# Patient Record
Sex: MALE | Race: WHITE | NOT HISPANIC OR LATINO | URBAN - METROPOLITAN AREA
[De-identification: names, ages, dates, MRNs, and addresses within clinical notes are randomized per-mention and may not be internally consistent; named-entity substitution may affect disease eponyms.]

---

## 2021-10-29 ENCOUNTER — WEB ENCOUNTER (OUTPATIENT)
Dept: URBAN - METROPOLITAN AREA CLINIC 19 | Facility: CLINIC | Age: 56
End: 2021-10-29

## 2021-10-29 ENCOUNTER — OFFICE VISIT (OUTPATIENT)
Dept: URBAN - METROPOLITAN AREA CLINIC 19 | Facility: CLINIC | Age: 56
End: 2021-10-29
Payer: COMMERCIAL

## 2021-10-29 ENCOUNTER — LAB OUTSIDE AN ENCOUNTER (OUTPATIENT)
Dept: URBAN - METROPOLITAN AREA CLINIC 19 | Facility: CLINIC | Age: 56
End: 2021-10-29

## 2021-10-29 ENCOUNTER — TELEPHONE ENCOUNTER (OUTPATIENT)
Dept: URBAN - METROPOLITAN AREA SURGERY CENTER 30 | Facility: SURGERY CENTER | Age: 56
End: 2021-10-29

## 2021-10-29 VITALS
BODY MASS INDEX: 35.92 KG/M2 | DIASTOLIC BLOOD PRESSURE: 98 MMHG | HEART RATE: 92 BPM | TEMPERATURE: 98.2 F | HEIGHT: 68 IN | SYSTOLIC BLOOD PRESSURE: 148 MMHG | WEIGHT: 237 LBS

## 2021-10-29 DIAGNOSIS — F10.20 UNCOMPLICATED ALCOHOL DEPENDENCE: ICD-10-CM

## 2021-10-29 DIAGNOSIS — Z86.010 PERSONAL HISTORY OF COLONIC POLYPS: ICD-10-CM

## 2021-10-29 DIAGNOSIS — R19.7 ACUTE DIARRHEA: ICD-10-CM

## 2021-10-29 PROCEDURE — 99214 OFFICE O/P EST MOD 30 MIN: CPT | Performed by: INTERNAL MEDICINE

## 2021-10-29 RX ORDER — SODIUM, POTASSIUM,MAG SULFATES 17.5-3.13G
354ML SOLUTION, RECONSTITUTED, ORAL ORAL
Qty: 354 MILLILITER | Refills: 0 | OUTPATIENT
Start: 2021-10-29 | End: 2021-10-30

## 2021-10-29 RX ORDER — METOPROLOL TARTRATE 50 MG/1
TAKE 1 TABLET (50 MG) BY ORAL ROUTE 2 TIMES PER DAY WITH MEALS TABLET, FILM COATED ORAL 2
Qty: 0 | Refills: 0 | Status: ACTIVE | COMMUNITY
Start: 1900-01-01

## 2021-10-29 NOTE — HPI-TODAY'S VISIT:
Mr. Bernal is a 56-year-old male who presents today for evaluation of chronic diarrhea.  He reports his symptoms have been ongoing since February 2021, but have gotten worse over the last month.He reports he had cellulitis in January/February and was on antibiotics.  He was tested for C. difficile in February and it was negative.  He was given Flagyl in February for his diarrhea, but he did not take it because he drinks alcohol daily.  No recent travel outside the [place].  No medication, diet, or weight changes.  He has tried Pepto-Bismol, which is no longer helping.  Cholecystectomy in 2010.  No abdominal pain or nausea.  He reports he has urgency and has had accidents.  He reports he has a long drive to work.  Reports he had 17 bowel movements yesterday.  He reports his stool is always loose and watery with mucus.  No blood in stool.  Unsure when his last blood work was.  He reports he is afraid to stop drinking.  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  He reports he was previously drinking 10 drinks a day, but he is down to 3 or 4 now. He denies liver disease. He reports he has been on Prilosec 20 mg since 2010 for GERD.  He denies cardiac/kidney disease, diabetes, blood thinners, and home O2.  Last colonoscopy with Dr. Barron in 2019- advised to repeat in 3 years due to polyps.

## 2021-10-31 LAB
A/G RATIO: 1.5
ALBUMIN: 3.6
ALKALINE PHOSPHATASE: 109
ALT (SGPT): 102
AST (SGOT): 255
BILIRUBIN, TOTAL: 1.4
BUN/CREATININE RATIO: 15
BUN: 12
C-REACTIVE PROTEIN, QUANT: <1
CALCIUM: 8.1
CARBON DIOXIDE, TOTAL: 21
CHLORIDE: 86
CREATININE: 0.82
EGFR IF AFRICN AM: 116
EGFR IF NONAFRICN AM: 100
ENDOMYSIAL ANTIBODY IGA: NEGATIVE
GLOBULIN, TOTAL: 2.4
GLUCOSE: 143
HEMATOCRIT: 46.9
HEMATOLOGY COMMENTS:: (no result)
HEMOGLOBIN: 17.2
IMMUNOGLOBULIN A, QN, SERUM: 213
MCH: 37.1
MCHC: 36.7
MCV: 101
NRBC: (no result)
PLATELETS: 170
POTASSIUM: 3.8
PROTEIN, TOTAL: 6
RBC: 4.64
RDW: 14.6
SODIUM: 125
T-TRANSGLUTAMINASE (TTG) IGA: <2
WBC: 6.6

## 2021-11-02 PROBLEM — 66590003: Status: ACTIVE | Noted: 2021-11-02

## 2021-11-02 PROBLEM — 62315008: Status: ACTIVE | Noted: 2021-10-29

## 2021-11-02 PROBLEM — 428283002: Status: ACTIVE | Noted: 2021-10-29

## 2021-11-03 ENCOUNTER — TELEPHONE ENCOUNTER (OUTPATIENT)
Dept: URBAN - METROPOLITAN AREA CLINIC 92 | Facility: CLINIC | Age: 56
End: 2021-11-03

## 2021-11-03 ENCOUNTER — OFFICE VISIT (OUTPATIENT)
Dept: URBAN - METROPOLITAN AREA SURGERY CENTER 31 | Facility: SURGERY CENTER | Age: 56
End: 2021-11-03
Payer: COMMERCIAL

## 2021-11-03 DIAGNOSIS — R19.7 ACUTE DIARRHEA: ICD-10-CM

## 2021-11-03 DIAGNOSIS — Z86.010 ADENOMAS PERSONAL HISTORY OF COLONIC POLYPS: ICD-10-CM

## 2021-11-03 DIAGNOSIS — D12.4 ADENOMA OF DESCENDING COLON: ICD-10-CM

## 2021-11-03 PROBLEM — 62315008 DIARRHEA: Status: ACTIVE | Noted: 2021-11-03

## 2021-11-03 PROCEDURE — G8907 PT DOC NO EVENTS ON DISCHARG: HCPCS | Performed by: INTERNAL MEDICINE

## 2021-11-03 PROCEDURE — 45380 COLONOSCOPY AND BIOPSY: CPT | Performed by: INTERNAL MEDICINE

## 2021-11-03 PROCEDURE — 45385 COLONOSCOPY W/LESION REMOVAL: CPT | Performed by: INTERNAL MEDICINE

## 2021-11-03 RX ORDER — DIPHENOXYLATE HYDROCHLORIDE AND ATROPINE SULFATE 2.5; .025 MG/1; MG/1
1-2 TABLETS TABLET ORAL
Qty: 60 | Refills: 0 | OUTPATIENT
Start: 2021-11-03 | End: 2021-11-12

## 2021-11-03 RX ORDER — METOPROLOL TARTRATE 50 MG/1
TAKE 1 TABLET (50 MG) BY ORAL ROUTE 2 TIMES PER DAY WITH MEALS TABLET, FILM COATED ORAL 2
Qty: 0 | Refills: 0 | Status: ACTIVE | COMMUNITY
Start: 1900-01-01

## 2021-11-04 LAB — GASTROINTESTINAL PATHOGEN: (no result)

## 2021-11-12 LAB
CALPROTECTIN, FECAL: 104
FATS, NEUTRAL: NORMAL
FATS, TOTAL: NORMAL
PANCREATIC ELASTASE, FECAL: 475

## 2021-11-30 ENCOUNTER — OFFICE VISIT (OUTPATIENT)
Dept: URBAN - METROPOLITAN AREA CLINIC 19 | Facility: CLINIC | Age: 56
End: 2021-11-30
Payer: COMMERCIAL

## 2021-11-30 DIAGNOSIS — K58.9 IBS (IRRITABLE BOWEL SYNDROME)-DIARRHEA: ICD-10-CM

## 2021-11-30 DIAGNOSIS — F10.10 ALCOHOL ABUSE: ICD-10-CM

## 2021-11-30 DIAGNOSIS — R74.01 ELEVATED TRANSAMINASE LEVEL: ICD-10-CM

## 2021-11-30 DIAGNOSIS — R17 ELEVATED BILIRUBIN: ICD-10-CM

## 2021-11-30 PROBLEM — 15167005 ALCOHOL ABUSE: Status: ACTIVE | Noted: 2021-11-30

## 2021-11-30 PROBLEM — 18165001 JAUNDICE: Status: ACTIVE | Noted: 2021-11-30

## 2021-11-30 PROCEDURE — 99214 OFFICE O/P EST MOD 30 MIN: CPT | Performed by: INTERNAL MEDICINE

## 2021-11-30 RX ORDER — METOPROLOL TARTRATE 50 MG/1
TAKE 1 TABLET (50 MG) BY ORAL ROUTE 2 TIMES PER DAY WITH MEALS TABLET, FILM COATED ORAL 2
Qty: 0 | Refills: 0 | Status: ACTIVE | COMMUNITY
Start: 1900-01-01

## 2021-11-30 RX ORDER — RIFAXIMIN 550 MG/1
1 TABLET TABLET ORAL THREE TIMES A DAY
Qty: 42 TABLET | Refills: 0 | OUTPATIENT
Start: 2021-11-30 | End: 2021-12-14

## 2021-11-30 RX ORDER — DIPHENOXYLATE HYDROCHLORIDE AND ATROPINE SULFATE 2.5; .025 MG/1; MG/1
1 TABLET AS NEEDED TABLET ORAL
Qty: 120 TABLET | Refills: 0 | OUTPATIENT
Start: 2021-11-30 | End: 2021-12-30

## 2021-11-30 NOTE — HPI-TODAY'S VISIT:
10/29/21 (Macrina Irizarry, SHAR):  Mr. Bernal is a 56-year-old male who presents today for evaluation of chronic diarrhea.  He reports his symptoms have been ongoing since February 2021, but have gotten worse over the last month.He reports he had cellulitis in January/February and was on antibiotics.  He was tested for C. difficile in February and it was negative.  He was given Flagyl in February for his diarrhea, but he did not take it because he drinks alcohol daily.  No recent travel outside the [place].  No medication, diet, or weight changes.  He has tried Pepto-Bismol, which is no longer helping.  Cholecystectomy in 2010.  No abdominal pain or nausea.  He reports he has urgency and has had accidents.  He reports he has a long drive to work.  Reports he had 17 bowel movements yesterday.  He reports his stool is always loose and watery with mucus.  No blood in stool.  Unsure when his last blood work was.  He reports he is afraid to stop drinking.  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  He reports he was previously drinking 10 drinks a day, but he is down to 3 or 4 now. He denies liver disease. He reports he has been on Prilosec 20 mg since 2010 for GERD.  He denies cardiac/kidney disease, diabetes, blood thinners, and home O2.  Last colonoscopy with Dr. Barron in 2019- advised to repeat in 3 years due to polyps.  11/30/21:  Patient returns to clinic to see me after his colonoscopy.  Macrina Irizarry had ordered some stool tests at his last visit that basically ruled out any infectious, inflammatory or malabsorptive cause for his diarrhea (negative GI pathogen panel, pancreatic elastase, fecal fat, celiac panel, inflammatory markers).  On 11/3/21, I performed his colonoscopy and found no inflammation, both endoscopically and pathologically.  I did find multiple colon polyps that I removed - he will need surveillance in 3 years.  I gave him Lomotil at the time of his colonoscopy that appear to have helped him avoid severe urgency and fecal incontinence.  However, we discussed the possibility of IBS-D and diarrhea related to excessive alcohol use.  He had labs drawn back on 10/29/21 that showed a sodium of 125, bilirubin 1.4, , and .  Patient is still afraid to stop drinking without medical supervision (detox), and he does not want the stigma attached to alcohol dependence.  He has a counselor who is reportedly helping him - I suggested that he take a "vacation" during which time he can get through the withdrawal period.  There are certainly concerns about developing cirrhosis, although he shows no signs of jaundice, volume overload, bleeding, or confusion.

## 2021-12-01 LAB
A/G RATIO: 1.8
ALBUMIN: 4.1
ALKALINE PHOSPHATASE: 76
ALT (SGPT): 32
AST (SGOT): 26
BILIRUBIN, TOTAL: 0.3
BUN/CREATININE RATIO: 14
BUN: 13
CALCIUM: 9
CARBON DIOXIDE, TOTAL: 22
CHLORIDE: 105
CREATININE: 0.9
EGFR IF AFRICN AM: 112
EGFR IF NONAFRICN AM: 96
GLOBULIN, TOTAL: 2.3
GLUCOSE: 128
HEMATOCRIT: 39.3
HEMOGLOBIN: 13.5
INR: 0.9
MCH: 35.7
MCHC: 34.4
MCV: 104
NRBC: (no result)
PLATELETS: 158
POTASSIUM: 4.4
PROTEIN, TOTAL: 6.4
PROTHROMBIN TIME: 9.8
RBC: 3.78
RDW: 14.4
SODIUM: 144
WBC: 7

## 2021-12-09 ENCOUNTER — WEB ENCOUNTER (OUTPATIENT)
Dept: URBAN - METROPOLITAN AREA CLINIC 19 | Facility: CLINIC | Age: 56
End: 2021-12-09

## 2022-02-22 ENCOUNTER — OFFICE VISIT (OUTPATIENT)
Dept: URBAN - METROPOLITAN AREA CLINIC 19 | Facility: CLINIC | Age: 57
End: 2022-02-22

## 2022-02-28 ENCOUNTER — OFFICE VISIT (OUTPATIENT)
Dept: URBAN - METROPOLITAN AREA CLINIC 19 | Facility: CLINIC | Age: 57
End: 2022-02-28
Payer: COMMERCIAL

## 2022-02-28 ENCOUNTER — WEB ENCOUNTER (OUTPATIENT)
Dept: URBAN - METROPOLITAN AREA CLINIC 19 | Facility: CLINIC | Age: 57
End: 2022-02-28

## 2022-02-28 DIAGNOSIS — K58.9 IBS (IRRITABLE BOWEL SYNDROME)-DIARRHEA: ICD-10-CM

## 2022-02-28 DIAGNOSIS — K70.10 ALCOHOLIC HEPATITIS: ICD-10-CM

## 2022-02-28 PROCEDURE — 99213 OFFICE O/P EST LOW 20 MIN: CPT | Performed by: INTERNAL MEDICINE

## 2022-02-28 RX ORDER — LANSOPRAZOLE 30 MG
1 CAPSULE BEFORE A MEAL CAPSULE,DELAYED RELEASE (ENTERIC COATED) ORAL ONCE A DAY
Status: ACTIVE | COMMUNITY

## 2022-02-28 RX ORDER — TOPIRAMATE 200 MG
1 TABLET TABLET ORAL ONCE A DAY
Status: ACTIVE | COMMUNITY

## 2022-02-28 RX ORDER — METOPROLOL TARTRATE 50 MG/1
TAKE 1 TABLET (50 MG) BY ORAL ROUTE 2 TIMES PER DAY WITH MEALS TABLET, FILM COATED ORAL 2
Qty: 0 | Refills: 0 | Status: ACTIVE | COMMUNITY
Start: 1900-01-01

## 2022-02-28 RX ORDER — TRAZODONE HYDROCHLORIDE 100 MG/1
1 TABLET AT BEDTIME TABLET, FILM COATED ORAL ONCE A DAY
Status: ACTIVE | COMMUNITY

## 2022-02-28 NOTE — HPI-TODAY'S VISIT:
10/29/21 (Macrina Irizarry, SHAR):  Mr. Bernal is a 56-year-old male who presents today for evaluation of chronic diarrhea.  He reports his symptoms have been ongoing since February 2021, but have gotten worse over the last month.He reports he had cellulitis in January/February and was on antibiotics.  He was tested for C. difficile in February and it was negative.  He was given Flagyl in February for his diarrhea, but he did not take it because he drinks alcohol daily.  No recent travel outside the [place].  No medication, diet, or weight changes.  He has tried Pepto-Bismol, which is no longer helping.  Cholecystectomy in 2010.  No abdominal pain or nausea.  He reports he has urgency and has had accidents.  He reports he has a long drive to work.  Reports he had 17 bowel movements yesterday.  He reports his stool is always loose and watery with mucus.  No blood in stool.  Unsure when his last blood work was.  He reports he is afraid to stop drinking.  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  He reports he was previously drinking 10 drinks a day, but he is down to 3 or 4 now. He denies liver disease. He reports he has been on Prilosec 20 mg since 2010 for GERD.  He denies cardiac/kidney disease, diabetes, blood thinners, and home O2.  Last colonoscopy with Dr. Barron in 2019- advised to repeat in 3 years due to polyps.  11/30/21:  Patient returns to clinic to see me after his colonoscopy.  Macrina Irizarry had ordered some stool tests at his last visit that basically ruled out any infectious, inflammatory or malabsorptive cause for his diarrhea (negative GI pathogen panel, pancreatic elastase, fecal fat, celiac panel, inflammatory markers).  On 11/3/21, I performed his colonoscopy and found no inflammation, both endoscopically and pathologically.  I did find multiple colon polyps that I removed - he will need surveillance in 3 years.  I gave him Lomotil at the time of his colonoscopy that appear to have helped him avoid severe urgency and fecal incontinence.  However, we discussed the possibility of IBS-D and diarrhea related to excessive alcohol use.  He had labs drawn back on 10/29/21 that showed a sodium of 125, bilirubin 1.4, , and .  Patient is still afraid to stop drinking without medical supervision (detox), and he does not want the stigma attached to alcohol dependence.  He has a counselor who is reportedly helping him - I suggested that he take a "vacation" during which time he can get through the withdrawal period.  There are certainly concerns about developing cirrhosis, although he shows no signs of jaundice, volume overload, bleeding, or confusion.  2/28/22:  Patient returns to clinic for reevaluation of his GI issues.  He has been sober now for 38 days and feels better, especially after completion of Xifaxan.  He has only had to use Lomotil one time.  Overall, he feels that the diarrhea has resolved.

## 2022-03-01 LAB
A/G RATIO: 2
ALBUMIN: 4.6
ALKALINE PHOSPHATASE: 84
ALT (SGPT): 28
AST (SGOT): 19
BILIRUBIN, TOTAL: 0.4
BUN/CREATININE RATIO: 12
BUN: 11
CALCIUM: 10.4
CARBON DIOXIDE, TOTAL: 21
CHLORIDE: 104
CREATININE: 0.94
EGFR: 96
GLOBULIN, TOTAL: 2.3
GLUCOSE: 173
HEMATOCRIT: 45.5
HEMOGLOBIN: 15.1
MCH: 32.8
MCHC: 33.2
MCV: 99
NRBC: (no result)
PLATELETS: 199
POTASSIUM: 4.2
PROTEIN, TOTAL: 6.9
RBC: 4.6
RDW: 13
SODIUM: 140
WBC: 8.2

## 2022-08-29 ENCOUNTER — DASHBOARD ENCOUNTERS (OUTPATIENT)
Age: 57
End: 2022-08-29

## 2022-08-29 ENCOUNTER — OFFICE VISIT (OUTPATIENT)
Dept: URBAN - METROPOLITAN AREA CLINIC 19 | Facility: CLINIC | Age: 57
End: 2022-08-29
Payer: COMMERCIAL

## 2022-08-29 VITALS
HEIGHT: 68 IN | BODY MASS INDEX: 37.13 KG/M2 | WEIGHT: 245 LBS | SYSTOLIC BLOOD PRESSURE: 123 MMHG | DIASTOLIC BLOOD PRESSURE: 79 MMHG | HEART RATE: 93 BPM | TEMPERATURE: 98.9 F | OXYGEN SATURATION: 98 %

## 2022-08-29 DIAGNOSIS — K70.10 ALCOHOLIC HEPATITIS: ICD-10-CM

## 2022-08-29 DIAGNOSIS — K58.9 IBS (IRRITABLE BOWEL SYNDROME)-DIARRHEA: ICD-10-CM

## 2022-08-29 PROCEDURE — 99213 OFFICE O/P EST LOW 20 MIN: CPT | Performed by: INTERNAL MEDICINE

## 2022-08-29 RX ORDER — TRAZODONE HYDROCHLORIDE 100 MG/1
1 TABLET AT BEDTIME TABLET, FILM COATED ORAL ONCE A DAY
Status: ACTIVE | COMMUNITY

## 2022-08-29 RX ORDER — METOPROLOL TARTRATE 50 MG/1
TAKE 1 TABLET (50 MG) BY ORAL ROUTE 2 TIMES PER DAY WITH MEALS TABLET, FILM COATED ORAL 2
Qty: 0 | Refills: 0 | Status: ACTIVE | COMMUNITY
Start: 1900-01-01

## 2022-08-29 RX ORDER — TOPIRAMATE 200 MG
1 TABLET TABLET ORAL ONCE A DAY
Status: ACTIVE | COMMUNITY

## 2022-08-29 RX ORDER — LANSOPRAZOLE 30 MG
1 CAPSULE BEFORE A MEAL CAPSULE,DELAYED RELEASE (ENTERIC COATED) ORAL ONCE A DAY
Status: ACTIVE | COMMUNITY

## 2022-08-29 NOTE — HPI-TODAY'S VISIT:
10/29/21 (Macrina Irizarry, SHAR):  Mr. Bernal is a 56-year-old male who presents today for evaluation of chronic diarrhea.  He reports his symptoms have been ongoing since February 2021, but have gotten worse over the last month.He reports he had cellulitis in January/February and was on antibiotics.  He was tested for C. difficile in February and it was negative.  He was given Flagyl in February for his diarrhea, but he did not take it because he drinks alcohol daily.  No recent travel outside the [place].  No medication, diet, or weight changes.  He has tried Pepto-Bismol, which is no longer helping.  Cholecystectomy in 2010.  No abdominal pain or nausea.  He reports he has urgency and has had accidents.  He reports he has a long drive to work.  Reports he had 17 bowel movements yesterday.  He reports his stool is always loose and watery with mucus.  No blood in stool.  Unsure when his last blood work was.  He reports he is afraid to stop drinking.  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  He reports he was previously drinking 10 drinks a day, but he is down to 3 or 4 now. He denies liver disease. He reports he has been on Prilosec 20 mg since 2010 for GERD.  He denies cardiac/kidney disease, diabetes, blood thinners, and home O2.  Last colonoscopy with Dr. Barron in 2019- advised to repeat in 3 years due to polyps.  11/30/21:  Patient returns to clinic to see me after his colonoscopy.  Macrina Irizarry had ordered some stool tests at his last visit that basically ruled out any infectious, inflammatory or malabsorptive cause for his diarrhea (negative GI pathogen panel, pancreatic elastase, fecal fat, celiac panel, inflammatory markers).  On 11/3/21, I performed his colonoscopy and found no inflammation, both endoscopically and pathologically.  I did find multiple colon polyps that I removed - he will need surveillance in 3 years.  I gave him Lomotil at the time of his colonoscopy that appear to have helped him avoid severe urgency and fecal incontinence.  However, we discussed the possibility of IBS-D and diarrhea related to excessive alcohol use.  He had labs drawn back on 10/29/21 that showed a sodium of 125, bilirubin 1.4, , and .  Patient is still afraid to stop drinking without medical supervision (detox), and he does not want the stigma attached to alcohol dependence.  He has a counselor who is reportedly helping him - I suggested that he take a "vacation" during which time he can get through the withdrawal period.  There are certainly concerns about developing cirrhosis, although he shows no signs of jaundice, volume overload, bleeding, or confusion.  2/28/22:  Patient returns to clinic for reevaluation of his GI issues.  He has been sober now for 38 days and feels better, especially after completion of Xifaxan.  He has only had to use Lomotil one time.  Overall, he feels that the diarrhea has resolved.  8/29/22:  Patient returns for reevaluation of his elevated LFTs/alcoholic hepatitis and diarrhea.  He has been sober since I last saw him, and his LFTs have all returned to normal.  He has no signs of chronic liver disease, such as jaundice, confusion, bleeding, or edema/ascites.  He feels well overall.  He still has concerns over bowel urgency, but he has not had an episode of fecal incontinence in over four months.  He will be due for a colonoscopy for colon polyp surveillance in November 2024.

## 2022-08-31 PROBLEM — 10743008 IRRITABLE BOWEL SYNDROME: Status: ACTIVE | Noted: 2021-11-30

## 2022-08-31 PROBLEM — 235875008 ALCOHOLIC HEPATITIS: Status: ACTIVE | Noted: 2022-02-28
